# Patient Record
Sex: FEMALE | Race: WHITE | Employment: FULL TIME | URBAN - METROPOLITAN AREA
[De-identification: names, ages, dates, MRNs, and addresses within clinical notes are randomized per-mention and may not be internally consistent; named-entity substitution may affect disease eponyms.]

---
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
## 2021-02-02 ENCOUNTER — HOSPITAL ENCOUNTER (EMERGENCY)
Age: 21
Discharge: LEFT BEFORE DISCHARGE | End: 2021-02-02
Payer: COMMERCIAL

## 2021-02-02 VITALS — OXYGEN SATURATION: 98 % | HEART RATE: 118 BPM

## 2021-02-02 NOTE — ED NOTES
Pt was seen leaving [facility] and into a vehicle in [place], RR even and unlabored. Did not notify anyone that she was leaving facility or reason why.      Jewell Chung RN  02/02/21 [id number]

## 2021-06-30 ENCOUNTER — HOSPITAL ENCOUNTER (INPATIENT)
Age: 21
LOS: 1 days | Discharge: HOME | DRG: 753 | End: 2021-06-30
Attending: EMERGENCY MEDICINE | Admitting: PSYCHIATRY & NEUROLOGY
Payer: COMMERCIAL

## 2021-06-30 VITALS
HEIGHT: 65 IN | HEART RATE: 67 BPM | RESPIRATION RATE: 14 BRPM | SYSTOLIC BLOOD PRESSURE: 121 MMHG | WEIGHT: 170 LBS | DIASTOLIC BLOOD PRESSURE: 53 MMHG | BODY MASS INDEX: 28.32 KG/M2 | OXYGEN SATURATION: 99 % | TEMPERATURE: 97.5 F

## 2021-06-30 DIAGNOSIS — R45.851 DEPRESSION WITH SUICIDAL IDEATION: Primary | ICD-10-CM

## 2021-06-30 DIAGNOSIS — F32.A DEPRESSION WITH SUICIDAL IDEATION: Primary | ICD-10-CM

## 2021-06-30 PROBLEM — F33.0 MAJOR DEPRESSIVE DISORDER, RECURRENT EPISODE, MILD (HCC): Status: ACTIVE | Noted: 2021-06-30

## 2021-06-30 LAB
A/G RATIO: 1.5 (ref 1.1–2.2)
ACETAMINOPHEN LEVEL: <5 UG/ML (ref 10–30)
ALBUMIN SERPL-MCNC: 4.1 G/DL (ref 3.4–5)
ALP BLD-CCNC: 49 U/L (ref 40–129)
ALT SERPL-CCNC: 15 U/L (ref 10–40)
AMPHETAMINE SCREEN, URINE: POSITIVE
ANION GAP SERPL CALCULATED.3IONS-SCNC: 11 MMOL/L (ref 3–16)
AST SERPL-CCNC: 16 U/L (ref 15–37)
BARBITURATE SCREEN URINE: ABNORMAL
BASOPHILS ABSOLUTE: 0.1 K/UL (ref 0–0.2)
BASOPHILS RELATIVE PERCENT: 0.9 %
BENZODIAZEPINE SCREEN, URINE: ABNORMAL
BILIRUB SERPL-MCNC: <0.2 MG/DL (ref 0–1)
BILIRUBIN URINE: NEGATIVE
BLOOD, URINE: NEGATIVE
BUN BLDV-MCNC: 13 MG/DL (ref 7–20)
CALCIUM SERPL-MCNC: 9 MG/DL (ref 8.3–10.6)
CANNABINOID SCREEN URINE: ABNORMAL
CHLORIDE BLD-SCNC: 106 MMOL/L (ref 99–110)
CLARITY: CLEAR
CO2: 22 MMOL/L (ref 21–32)
COCAINE METABOLITE SCREEN URINE: ABNORMAL
COLOR: YELLOW
CREAT SERPL-MCNC: <0.5 MG/DL (ref 0.6–1.1)
EOSINOPHILS ABSOLUTE: 0.1 K/UL (ref 0–0.6)
EOSINOPHILS RELATIVE PERCENT: 1.3 %
ETHANOL: NORMAL MG/DL (ref 0–0.08)
GFR AFRICAN AMERICAN: >60
GFR NON-AFRICAN AMERICAN: >60
GLOBULIN: 2.7 G/DL
GLUCOSE BLD-MCNC: 134 MG/DL (ref 70–99)
GLUCOSE URINE: NEGATIVE MG/DL
HCG(URINE) PREGNANCY TEST: NEGATIVE
HCT VFR BLD CALC: 40.1 % (ref 36–48)
HEMOGLOBIN: 13.6 G/DL (ref 12–16)
KETONES, URINE: NEGATIVE MG/DL
LEUKOCYTE ESTERASE, URINE: NEGATIVE
LYMPHOCYTES ABSOLUTE: 2.2 K/UL (ref 1–5.1)
LYMPHOCYTES RELATIVE PERCENT: 27 %
Lab: ABNORMAL
MCH RBC QN AUTO: 31 PG (ref 26–34)
MCHC RBC AUTO-ENTMCNC: 33.8 G/DL (ref 31–36)
MCV RBC AUTO: 91.7 FL (ref 80–100)
METHADONE SCREEN, URINE: ABNORMAL
MICROSCOPIC EXAMINATION: NORMAL
MONOCYTES ABSOLUTE: 0.7 K/UL (ref 0–1.3)
MONOCYTES RELATIVE PERCENT: 8 %
NEUTROPHILS ABSOLUTE: 5.1 K/UL (ref 1.7–7.7)
NEUTROPHILS RELATIVE PERCENT: 62.8 %
NITRITE, URINE: NEGATIVE
OPIATE SCREEN URINE: ABNORMAL
OXYCODONE URINE: ABNORMAL
PDW BLD-RTO: 12.5 % (ref 12.4–15.4)
PH UA: 5.5
PH UA: 5.5 (ref 5–8)
PHENCYCLIDINE SCREEN URINE: ABNORMAL
PLATELET # BLD: 200 K/UL (ref 135–450)
PMV BLD AUTO: 9.5 FL (ref 5–10.5)
POTASSIUM REFLEX MAGNESIUM: 3.9 MMOL/L (ref 3.5–5.1)
PROPOXYPHENE SCREEN: ABNORMAL
PROTEIN UA: NEGATIVE MG/DL
RBC # BLD: 4.37 M/UL (ref 4–5.2)
SALICYLATE, SERUM: <0.3 MG/DL (ref 15–30)
SARS-COV-2, NAAT: NOT DETECTED
SODIUM BLD-SCNC: 139 MMOL/L (ref 136–145)
SPECIFIC GRAVITY UA: >=1.03 (ref 1–1.03)
TOTAL PROTEIN: 6.8 G/DL (ref 6.4–8.2)
URINE REFLEX TO CULTURE: NORMAL
URINE TYPE: NORMAL
UROBILINOGEN, URINE: 0.2 E.U./DL
WBC # BLD: 8.1 K/UL (ref 4–11)

## 2021-06-30 PROCEDURE — 84703 CHORIONIC GONADOTROPIN ASSAY: CPT

## 2021-06-30 PROCEDURE — 82077 ASSAY SPEC XCP UR&BREATH IA: CPT

## 2021-06-30 PROCEDURE — 1240000000 HC EMOTIONAL WELLNESS R&B

## 2021-06-30 PROCEDURE — 6370000000 HC RX 637 (ALT 250 FOR IP): Performed by: PSYCHIATRY & NEUROLOGY

## 2021-06-30 PROCEDURE — 80143 DRUG ASSAY ACETAMINOPHEN: CPT

## 2021-06-30 PROCEDURE — G0378 HOSPITAL OBSERVATION PER HR: HCPCS

## 2021-06-30 PROCEDURE — 99236 HOSP IP/OBS SAME DATE HI 85: CPT | Performed by: PSYCHIATRY & NEUROLOGY

## 2021-06-30 PROCEDURE — 5130000000 HC BRIDGE APPOINTMENT

## 2021-06-30 PROCEDURE — 81003 URINALYSIS AUTO W/O SCOPE: CPT

## 2021-06-30 PROCEDURE — 80307 DRUG TEST PRSMV CHEM ANLYZR: CPT

## 2021-06-30 PROCEDURE — 85025 COMPLETE CBC W/AUTO DIFF WBC: CPT

## 2021-06-30 PROCEDURE — 80053 COMPREHEN METABOLIC PANEL: CPT

## 2021-06-30 PROCEDURE — 99285 EMERGENCY DEPT VISIT HI MDM: CPT

## 2021-06-30 PROCEDURE — 87635 SARS-COV-2 COVID-19 AMP PRB: CPT

## 2021-06-30 PROCEDURE — 80179 DRUG ASSAY SALICYLATE: CPT

## 2021-06-30 RX ORDER — LISINOPRIL 5 MG/1
5 TABLET ORAL DAILY
COMMUNITY

## 2021-06-30 RX ORDER — IBUPROFEN 400 MG/1
400 TABLET ORAL EVERY 6 HOURS PRN
Status: DISCONTINUED | OUTPATIENT
Start: 2021-06-30 | End: 2021-06-30 | Stop reason: HOSPADM

## 2021-06-30 RX ORDER — BENZTROPINE MESYLATE 1 MG/ML
2 INJECTION INTRAMUSCULAR; INTRAVENOUS 2 TIMES DAILY PRN
Status: DISCONTINUED | OUTPATIENT
Start: 2021-06-30 | End: 2021-06-30 | Stop reason: HOSPADM

## 2021-06-30 RX ORDER — ESCITALOPRAM OXALATE 10 MG/1
5 TABLET ORAL DAILY
Status: DISCONTINUED | OUTPATIENT
Start: 2021-06-30 | End: 2021-06-30 | Stop reason: HOSPADM

## 2021-06-30 RX ORDER — ESCITALOPRAM OXALATE 5 MG/1
5 TABLET ORAL DAILY
Qty: 30 TABLET | Refills: 0 | Status: SHIPPED | OUTPATIENT
Start: 2021-06-30

## 2021-06-30 RX ORDER — MAGNESIUM HYDROXIDE/ALUMINUM HYDROXICE/SIMETHICONE 120; 1200; 1200 MG/30ML; MG/30ML; MG/30ML
30 SUSPENSION ORAL EVERY 6 HOURS PRN
Status: DISCONTINUED | OUTPATIENT
Start: 2021-06-30 | End: 2021-06-30 | Stop reason: HOSPADM

## 2021-06-30 RX ORDER — OLANZAPINE 10 MG/1
10 TABLET ORAL
Status: DISCONTINUED | OUTPATIENT
Start: 2021-06-30 | End: 2021-06-30 | Stop reason: HOSPADM

## 2021-06-30 RX ORDER — TRAZODONE HYDROCHLORIDE 50 MG/1
50 TABLET ORAL NIGHTLY
Status: ON HOLD | COMMUNITY
End: 2021-06-30 | Stop reason: HOSPADM

## 2021-06-30 RX ORDER — TRAZODONE HYDROCHLORIDE 100 MG/1
100 TABLET ORAL NIGHTLY
Status: DISCONTINUED | OUTPATIENT
Start: 2021-06-30 | End: 2021-06-30 | Stop reason: HOSPADM

## 2021-06-30 RX ORDER — OLANZAPINE 10 MG/1
10 INJECTION, POWDER, LYOPHILIZED, FOR SOLUTION INTRAMUSCULAR
Status: DISCONTINUED | OUTPATIENT
Start: 2021-06-30 | End: 2021-06-30 | Stop reason: HOSPADM

## 2021-06-30 RX ORDER — TRAZODONE HYDROCHLORIDE 100 MG/1
100 TABLET ORAL NIGHTLY
Qty: 30 TABLET | Refills: 0 | Status: SHIPPED | OUTPATIENT
Start: 2021-06-30

## 2021-06-30 RX ORDER — ACETAMINOPHEN 325 MG/1
650 TABLET ORAL EVERY 4 HOURS PRN
Status: DISCONTINUED | OUTPATIENT
Start: 2021-06-30 | End: 2021-06-30 | Stop reason: HOSPADM

## 2021-06-30 RX ADMIN — ESCITALOPRAM OXALATE 5 MG: 10 TABLET ORAL at 12:14

## 2021-06-30 ASSESSMENT — LIFESTYLE VARIABLES
HISTORY_ALCOHOL_USE: NO
HISTORY_ALCOHOL_USE: NO

## 2021-06-30 ASSESSMENT — SLEEP AND FATIGUE QUESTIONNAIRES
DO YOU USE A SLEEP AID: YES
RESTFUL SLEEP: NO
DIFFICULTY FALLING ASLEEP: YES
DIFFICULTY FALLING ASLEEP: YES
DO YOU HAVE DIFFICULTY SLEEPING: YES
AVERAGE NUMBER OF SLEEP HOURS: 5
AVERAGE NUMBER OF SLEEP HOURS: 5
DO YOU HAVE DIFFICULTY SLEEPING: YES
DO YOU USE A SLEEP AID: YES
DIFFICULTY STAYING ASLEEP: YES
DIFFICULTY ARISING: NO
DIFFICULTY STAYING ASLEEP: YES
RESTFUL SLEEP: NO
SLEEP PATTERN: DIFFICULTY FALLING ASLEEP;RESTLESSNESS;DISTURBED/INTERRUPTED SLEEP
SLEEP PATTERN: DIFFICULTY FALLING ASLEEP
DIFFICULTY ARISING: YES

## 2021-06-30 ASSESSMENT — PAIN SCALES - GENERAL
PAINLEVEL_OUTOF10: 0
PAINLEVEL_OUTOF10: 0
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
## 2021-06-30 NOTE — ED PROVIDER NOTES
CHIEF COMPLAINT  Suicidal (pt comes in tonight with police after texting a picture of a handgun to a friend/coworker saying she just doesnt think she can take any more stress. Coworker was at work and the police were there for another incident and he told them about text. F[employer] was shown the text and some conversation that followed by text to coworker; Pt has been placed on statement of belief by police)      Candido Angulo is a 22 y.o. female with a history of asthma, bipolar disorder who presents to the ED complaining of suicidal thoughts. Patient was brought in by police on a psychiatric hold after she texted a friend at work a picture of herself holding a handgun and stating she could not deal with distress any longer. Patient states she is under a severe amount of stress at work. States she is a  at [employer]. She denies drug or alcohol use. Denies toxic ingestion. Patient states the handgun she was holding belongs to her mother's boyfriend. No other complaints, modifying factors or associated symptoms. I have reviewed the following from the nursing documentation. Past Medical History:   Diagnosis Date    Asthma     Bipolar depression ([facility] [address]      Past Surgical History:   Procedure Laterality Date    EYE SURGERY       History reviewed. No pertinent family history.   Social History     Socioeconomic History    Marital status: Single     Spouse name: Not on file    Number of children: Not on file    Years of education: Not on file    Highest education level: Not on file   Occupational History    Not on file   Tobacco Use    Smoking status: Passive Smoke Exposure - Never Smoker    Smokeless tobacco: Never Used   Vaping Use    Vaping Use: Every day    Substances: Nicotine   Substance and Sexual Activity    Alcohol use: Yes     Comment: rarely    Drug use: No    Sexual activity: Yes     Partners: Male   Other Topics Concern    Not on file Social History Narrative    Not on file     Social Determinants of Health     Financial Resource Strain:     Difficulty of Paying Living Expenses:    Food Insecurity:     Worried About Running Out of Food in the Last Year:     [address] in the Last Year:    Transportation Needs:     Lack of Transportation (Medical):  Lack of Transportation (Non-Medical):    Physical Activity:     Days of Exercise per Week:     Minutes of Exercise per Session:    Stress:     Feeling of Stress :    Social Connections:     Frequency of Communication with Friends and Family:     Frequency of Social Gatherings with Friends and Family:     Attends Alevism Services:     Active Member of Clubs or Organizations:     Attends Club or Organization Meetings:     Marital Status:    Intimate Partner Violence:     Fear of Current or Ex-Partner:     Emotionally Abused:     Physically Abused:     Sexually Abused:      No current facility-administered medications for this encounter. Current Outpatient Medications   Medication Sig Dispense Refill    lisinopril (PRINIVIL;ZESTRIL) 5 MG tablet Take 5 mg by mouth daily      traZODone (DESYREL) 50 MG tablet Take 50 mg by mouth nightly      amphetamine-dextroamphetamine (ADDERALL XR) 25 MG extended release capsule TAKE ONE CAPSULE BY MOUTH EVERY MORNING UPON AWAKENING  0    albuterol (PROVENTIL) (2.5 MG/3ML) 0.083% nebulizer solution Take 3 mLs by nebulization every 6 hours as needed for Wheezing 120 each 1    albuterol sulfate HFA (PROVENTIL HFA) 108 (90 BASE) MCG/ACT inhaler Inhale 2 puffs into the lungs every 4 hours as needed for Wheezing or Shortness of Breath (Space out to every 6 hours as symptoms improve) Space out to every 6 hours as symptoms improve. 1 Inhaler 0    butalbital-aspirin-caffeine (FIORINAL) -40 MG capsule Take 1 capsule by mouth every 4 hours as needed for Headaches or Migraine for up to 7 days.  20 capsule 0    fluticasone-vilanterol (BREO ELLIPTA) 100-25 MCG/INH AEPB inhaler Inhale into the lungs daily      Respiratory Therapy Supplies (NEBULIZER COMPRESSOR) KIT 1 kit by Does not apply route once for 1 dose 1 kit 0    albuterol sulfate HFA (PROAIR HFA) 108 (90 BASE) MCG/ACT inhaler Inhale 2 puffs into the lungs every 6 hours as needed for Wheezing 1 Inhaler 0     Allergies   Allergen Reactions    Amoxapine And Related Anaphylaxis    Pcn [Penicillins] Swelling       REVIEW OF SYSTEMS  10 systems reviewed, pertinent positives per HPI otherwise noted to be negative. PHYSICAL EXAM  BP (!) 157/87   Pulse 83   Temp 97.8 °F (36.6 °C) (Oral)   Resp 12   Ht 5' 5\" (1.651 m)   Wt 170 lb (77.1 kg)   LMP 06/16/2021   SpO2 99%   Breastfeeding No   BMI 28.29 kg/m²   GENERAL APPEARANCE: Awake and alert. Cooperative. No acute distress. HEAD: Normocephalic. Atraumatic. EYES: PERRL. EOM's grossly intact. ENT: Mucous membranes are moist.   NECK: Supple, trachea midline. HEART: RRR. Normal S1, S2. No murmurs, rubs or gallops. LUNGS: Respirations unlabored. CTAB. Good air exchange. No wheezes, rales, or rhonchi. Speaking comfortably in full sentences. ABDOMEN: Soft. Non-distended. Non-tender. No guarding or rebound. Normal Bowel sounds. EXTREMITIES: No peripheral edema. MAEE. No acute deformities. SKIN: Warm and dry. No acute rashes. NEUROLOGICAL: Alert and oriented X 3. CN II-XII intact. No gross facial drooping. Strength 5/5, sensation intact. No pronator drift. Normal coordination. Gait normal.   PSYCHIATRIC: Flat affect. LABS  I have reviewed all labs for this visit.    Results for orders placed or performed during the hospital encounter of 06/30/21   COVID-19, Rapid    Specimen: Nasopharyngeal Swab   Result Value Ref Range    SARS-CoV-2, NAAT Not Detected Not Detected   CBC auto differential   Result Value Ref Range    WBC 8.1 4.0 - 11.0 K/uL    RBC 4.37 4.00 - 5.20 M/uL    Hemoglobin 13.6 12.0 - 16.0 g/dL    Hematocrit 40.1 36.0 - 48.0 %    MCV 91.7 80.0 - 100.0 fL    MCH 31.0 26.0 - 34.0 pg    MCHC 33.8 31.0 - 36.0 g/dL    RDW 12.5 12.4 - 15.4 %    Platelets 791 981 - 798 K/uL    MPV 9.5 5.0 - 10.5 fL    Neutrophils % 62.8 %    Lymphocytes % 27.0 %    Monocytes % 8.0 %    Eosinophils % 1.3 %    Basophils % 0.9 %    Neutrophils Absolute 5.1 1.7 - 7.7 K/uL    Lymphocytes Absolute 2.2 1.0 - 5.1 K/uL    Monocytes Absolute 0.7 0.0 - 1.3 K/uL    Eosinophils Absolute 0.1 0.0 - 0.6 K/uL    Basophils Absolute 0.1 0.0 - 0.2 K/uL   Comprehensive Metabolic Panel w/ Reflex to MG   Result Value Ref Range    Sodium 139 136 - 145 mmol/L    Potassium reflex Magnesium 3.9 3.5 - 5.1 mmol/L    Chloride 106 99 - 110 mmol/L    CO2 22 21 - 32 mmol/L    Anion Gap 11 3 - 16    Glucose 134 (H) 70 - 99 mg/dL    BUN 13 7 - 20 mg/dL    CREATININE <0.5 (L) 0.6 - 1.1 mg/dL    GFR Non-African American >60 >60    GFR African American >60 >60    Calcium 9.0 8.3 - 10.6 mg/dL    Total Protein 6.8 6.4 - 8.2 g/dL    Albumin 4.1 3.4 - 5.0 g/dL    Albumin/Globulin Ratio 1.5 1.1 - 2.2    Total Bilirubin <0.2 0.0 - 1.0 mg/dL    Alkaline Phosphatase 49 40 - 129 U/L    ALT 15 10 - 40 U/L    AST 16 15 - 37 U/L    Globulin 2.7 g/dL   Ethanol   Result Value Ref Range    Ethanol Lvl None Detected mg/dL   Acetaminophen level   Result Value Ref Range    Acetaminophen Level <5 (L) 10 - 30 ug/mL   Salicylate   Result Value Ref Range    Salicylate, Serum <1.1 (L) 15.0 - 30.0 mg/dL   Urine, reflex to culture    Specimen: Urine, clean catch   Result Value Ref Range    Color, UA Yellow Straw/Yellow    Clarity, UA Clear Clear    Glucose, Ur Negative Negative mg/dL    Bilirubin Urine Negative Negative    Ketones, Urine Negative Negative mg/dL    Specific Gravity, UA >=1.030 1.005 - 1.030    Blood, Urine Negative Negative    pH, UA 5.5 5.0 - 8.0    Protein, UA Negative Negative mg/dL    Urobilinogen, Urine 0.2 <2.0 E.U./dL    Nitrite, Urine Negative Negative    Leukocyte Esterase, Urine Negative Negative    Microscopic Examination Not Indicated     Urine Type NotGiven     Urine Reflex to Culture Not Indicated    Pregnancy, urine   Result Value Ref Range    HCG(Urine) Pregnancy Test Negative Detects HCG level >20 MIU/mL   Drug screen multi urine   Result Value Ref Range    Amphetamine Screen, Urine POSITIVE (A) Negative <1000ng/mL    Barbiturate Screen, Ur Neg Negative <200 ng/mL    Benzodiazepine Screen, Urine Neg Negative <200 ng/mL    Cannabinoid Scrn, Ur Neg Negative <50 ng/mL    Cocaine Metabolite Screen, Urine Neg Negative <300 ng/mL    Opiate Scrn, Ur Neg Negative <300 ng/mL    PCP Screen, Urine Neg Negative <25 ng/mL    Methadone Screen, Urine Neg Negative <300 ng/mL    Propoxyphene Scrn, Ur Neg Negative <300 ng/mL    Oxycodone Urine Neg Negative <100 ng/ml    pH, UA 5.5     Drug Screen Comment: see below            RADIOLOGY  X-RAYS:  I have reviewed radiologic plain film image(s). ALL OTHER NON-PLAIN FILM IMAGES SUCH AS CT, ULTRASOUND AND MRI HAVE BEEN READ BY THE RADIOLOGIST. No orders to display              Rechecks: Physical assessment performed. Appears calm, nontoxic. ED COURSE/MDM  Patient seen and evaluated. Old records reviewed. Labs and imaging reviewed and results discussed with patient. Patient placed on a hold by police for suicidal ideation with access to a handgun. Medically cleared for psychiatric admission. New Prescriptions    No medications on file       CLINICAL IMPRESSION  1. Depression with suicidal ideation        Blood pressure (!) 157/87, pulse 83, temperature 97.8 °F (36.6 °C), temperature source Oral, resp. rate 12, height 5' 5\" (1.651 m), weight 170 lb (77.1 kg), last menstrual period 06/16/2021, SpO2 99 %, not currently breastfeeding. DISPOSITION  Ivana Bhandari was admitted in stable condition.         Tyson Rodriguez MD  06/30/21 [id number]

## 2021-06-30 NOTE — BH NOTE
`[facility]  Admission Note     Admission Type:   Admission Type:  Involuntary    Reason for admission:  Reason for Admission: suicide ideation with plan to use loaded firearm    PATIENT STRENGTHS:  Strengths: Positive Support, Communication, Employment, Social Skills, Motivated    Patient Strengths and Limitations:  Limitations: Unrealistic self-view    Addictive Behavior:   Addictive Behavior  In the past 3 months, have you felt or has someone told you that you have a problem with:  : None  Do you have a history of Chemical Use?: No  Do you have a history of Alcohol Use?: No  Do you have a history of Street Drug Abuse?: No  Histroy of Prescripton Drug Abuse?: No    Medical Problems:   Past Medical History:   Diagnosis Date    Asthma     Bipolar depression ([facility] [address]        Status EXAM:  Status and Exam  Normal: No  Facial Expression: Sad  Affect: Constricted, Incongruent  Level of Consciousness: Alert  Mood:Normal: No  Mood: Other (Comment) (pt states euthymic which is incongruent with affect, appears sad)  Motor Activity:Normal: Yes  Interview Behavior: Cooperative  Preception: [place] to Person, [place] to Time, [place] to Place, [place] to Situation  Attention:Normal: Yes  Thought Content:Normal: Yes  Hallucinations: None  Delusions: No  Memory:Normal: Yes  Insight and Judgment: No  Insight and Judgment: Poor Judgment  Present Suicidal Ideation: No (none current)  Present Homicidal Ideation: No    Tobacco Screening:  Practical Counseling, on admission, adams X, if applicable and completed (first 3 are required if patient doesn't refuse):            (X)  Recognizing danger situations (included triggers and roadblocks)            (X)  Coping skills (new ways to manage stress, exercise, relaxation techniques, changing routine, distraction)                                                         (X)  Basic information about quitting (benefits of quitting, techniques in how to quit, available resources  ( ) Referral for counseling faxed to [facility]                      ( ) Patient refused counseling  ( ) Patient has not smoked in the last 30 days    Metabolic Screening:    No results found for: LABA1C    No results found for: CHOL  No results found for: TRIG  No results found for: HDL  No components found for: LDLCAL  No results found for: LABVLDL      Body mass index is 28.29 kg/m². BP Readings from Last 2 Encounters:   06/30/21 (!) 157/87   09/15/20 101/62           Pt admitted with followings belongings:  Dentures: None  Vision - Corrective Lenses: Glasses  Hearing Aid: None  Jewelry: Bracelet (2 bracelets)  Body Piercings Removed: N/A  Clothing: Pants, Shirt, Footwear  Were All Patient Medications Collected?: Not Applicable  Other Valuables: Cell phone, Wallet (1 CC, SS card, and apple watch)     Valuables placed in safe. Patient's home medications were not verified, will verify when pharmacy opens. Patient oriented to surroundings and program expectations and copy of patient rights given. Received admission packet:  yes. Consents reviewed, signed yes. Refused Voluntary. Patient verbalize understanding:  yes.     Patient education on precautions: yes                   Hamida Abel RN

## 2021-06-30 NOTE — BH NOTE
Patient tearful during admission. States she just wants to go home. States she is regretful of holding loaded handgun and states she would never act on harming herself.

## 2021-06-30 NOTE — SUICIDE SAFETY PLAN
SAFETY PLAN    A suicide Safety Plan is a document that supports someone when they are having thoughts of suicide. Warning Signs that indicate a suicidal crisis may be developing: What (situations, thoughts, feelings, body sensations, behaviors, etc.) do you experience that lets you know you are beginning to think about suicide? 1. anger  2. Distancing self from others  3. Keeping quiet    Internal Coping Strategies:  What things can I do (relaxation techniques, hobbies, physical activities, etc.) to take my mind off my problems without contacting another person? 1. Talk to a friend  2. Go for a drive  3. Hang with friends    People and social settings that provide distraction: Who can I call or where can I go to distract me? 1. Name: Radu Edwards  Phone: [phone]  2. Name: Harley Guillaume   Phone: [phone]   3. Place: Home            4. Place: Work    People whom I can ask for help: Who can I call when I need help - for example, friends, family, clergy, someone else? 1. Name: Talishacari Scotty                  2. Name: Otilio Shell    3. Name: Eh Jessica)      Professionals or [address] [place] [address] Blvd I can contact during a crisis: Who can I call for help - for example, my doctor, my psychiatrist, my psychologist, a mental health provider, a suicide hotline? 1. Clinician Name: [address] [place] [address]   Address: [address] [place], [facility]      Phone  #: [phone]    2. Suicide Prevention Lifeline: 6-001-669-TALK (2533)    Making the environment safe: How can I make my environment (house/apartment/living space) safer? For example, can I remove guns, medications, and other items? 1. Make sure there are no weapons in the area  2.  Talk to friends    Something that is important to me and worth living for is: my family

## 2021-06-30 NOTE — ED NOTES
Patient is positive for amphetamines however she is prescribed Adderall by PCP.       Carlie Guillaume RN  06/30/21 2180

## 2021-06-30 NOTE — ED NOTES
Collateral Contact:  Name: Cassie Salazar [phone]  Relation to Patient: mom   Last Contact with Patient: yesterday   Concerns: Esther reports the gun is secured and locked to where pt is not able to get to it. Esther reports she knew pt had been having issues but did not know how bad it was. She reports pt has delt with depression but has never been suicidal except when she was a elida in [facility]. 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 Esther reports pt does not have a therapist right now and states they have been trying to find one. She reports pt's PCP has been given her resources and trying to help find a therapist for pt. Esther reports the issue they are running into is that therapists are not taking new pt's or they do not accept pt's insurance. Esther reports she feels pt does not need an admission. She reports she feels pt would do well with being able to see a theropist a couple of times a month. Esther reports she would  pt if she is discharged.          Jay TEE
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
## 2021-06-30 NOTE — PROGRESS NOTES
Spoke with mother Michaelyn Bernheim [phone] for collateral. She reports the guns have been removed from the home. She reports she has no concerns or issues with patient returning home today. Reports she will be home today, works tomorrow and then will be home for another 3 days. Discussed services in [facility] and how a referral to [facility] will work. Patient amenable to referral to [facility]. Physician aware of above.  Antonette Mcdonald, LSW

## 2021-06-30 NOTE — ED NOTES
Level of Care Disposition: admit     Patient was seen by ED provider and [facility] staff. The case presented to psychiatric provider on-call . Based on the ED evaluation and information presented to the provider by Manoj Ayoub it is the recommendation of the on call psychiatric provider that inpatient hospitalization is the least restrictive environment for the patient at this time. The patient will be admitted to the inpatient unit. Admitting provider did not order suicide precautions based on pt nicole for safety on the unit. RATIONALE FOR ADMISSION:     Patient at imminent risk of danger to self as demonstrated by increase in depression, pt took a picture of a hand gun and sending it to a co-worker (friend) a picture of a hand gun after feeling overwhelmed and contemplating life tonight.             Insurance Pre certification Authorization: [address]

## 2021-06-30 NOTE — FLOWSHEET NOTE
06/30/21 1135   Activities of Daily Living   Patient Requires assistance with daily self-care activities? No   Leisure Activity 1   3 Favorite Leisure Activities \"Being with friends. \"   Frequency weekly   Last time this week   Barriers to participating    (None)   Leisure Activity 2   [address]  \"Drive around. \"   Frequency  weekly   Last time  this week   Barriers to participating    (None)   Leisure Activity 3   Favorite Leisure Activities  \"Listen to music. \"   Frequency  <2 hours/day   Last time  this week   Barriers to participating    (None)   Social   Patient reports spending the majority of their free time with a group   Patient verbalizes a preference for spending free time with a group   Patients perception of support system healthy/strong   Patients perception of barriers to socializing with others include(s) no perceived barriers   Social Details \"My mom and my friends are supportive. \"   Beliefs & Coping   Has difficulty dealing with feelings   Yes   Internalizes feelings/Keeps feelings in Yes   Externalizes feelings through aggressiveness or poor temper control  Yes   Feels uncomfortable around others  Yes   Has difficulty talking to others  Yes   Depends on others for direction or decisions Yes   Difficulty dealing with anger of others  Yes   Difficulty dealing with own anger  Yes   Difficulty managing stress Yes   Frequently has difficulty with relationships  No   Has recently perceived/experienced loss, disappointment, humiliation or failure  NO   Attitude about abilities more successful than not   Locus of Control  most of the time   Belief about recovery Recovery is possible   Patient Identified Strengths  \"I'm strong and able to get through anything. \"   Patient Identified Limitations  \"Myself. Major anxiety and negative thoughts. \"   Perception of most stressful event prior to hospitalization \"Me and my dad.  We've never had a relationship and he's been having health problems

## 2021-06-30 NOTE — BH NOTE
admission                    (X)  Coping skills (new ways to manage stress, exercise, relaxation techniques, changing routine, distraction), if not completed on admission                                                           (X)  Basic information about quitting (benefits of quitting, techniques in how to quit, available resources, if not completed on admission  ( ) Referral for counseling faxed to [facility]   ( ) Patient refused referral  ( ) Patient refused counseling  ( ) Patient refused smoking cessation medication upon discharge    Vaccinations (adams X if applicable and completed):  ( ) Patient states already received influenza vaccine elsewhere  ( ) Patient received influenza vaccine during this hospitalization  ( ) Patient refused influenza vaccine at this time  (X) Not offered

## 2021-06-30 NOTE — CARE COORDINATION
5 [facility]  Treatment Team Note  Day 1    Review Date & Time: 0900  6/30/2021    Patient was not in treatment team      Status EXAM:   Status and Exam  Normal: No  Facial Expression: Sad  Affect: Constricted  Level of Consciousness: Alert  Mood:Normal: No  Mood: Depressed, Anxious  Motor Activity:Normal: Yes  Interview Behavior: Cooperative  Preception: [place] to Person, Anabel Colonel to Time, [place] to Place, [place] to Situation  Attention:Normal: Yes  Thought Content:Normal: Yes  Hallucinations: None  Delusions: No  Memory:Normal: Yes  Insight and Judgment: No  Insight and Judgment: Poor Insight, Poor Judgment  Present Suicidal Ideation: No  Present Homicidal Ideation: No      Suicide Risk CSSR-S:  1) Within the past month, have you wished you were dead or wished you could go to sleep and not wake up? : Yes  2) Have you actually had any thoughts of killing yourself? : Yes  3) Have you been thinking about how you might kill yourself? : Yes  5) Have you started to work out or worked out the details of how to kill yourself? Do you intend to carry out this plan? : No  6) Have you ever done anything, started to do anything, or prepared to do anything to end your life?: Yes      PLAN/TREATMENT RECOMMENDATIONS UPDATE: Patient will take medication as prescribed, eat 75% of meals, attend groups, participate in milieu activities, participate in treatment team and care planning for discharge and follow up.       Dennis Camarillo RN

## 2021-07-09 ENCOUNTER — HOSPITAL ENCOUNTER (OUTPATIENT)
Dept: PSYCHIATRY | Age: 21
Setting detail: THERAPIES SERIES
Discharge: HOME | End: 2021-07-09
Payer: COMMERCIAL

## 2021-07-09 VITALS
HEART RATE: 64 BPM | DIASTOLIC BLOOD PRESSURE: 64 MMHG | SYSTOLIC BLOOD PRESSURE: 130 MMHG | TEMPERATURE: 97.1 F | RESPIRATION RATE: 16 BRPM

## 2021-07-09 PROCEDURE — 5130000000 HC BRIDGE APPOINTMENT: Performed by: PSYCHIATRY & NEUROLOGY

## 2021-07-09 PROCEDURE — 99215 OFFICE O/P EST HI 40 MIN: CPT | Performed by: PSYCHIATRY & NEUROLOGY

## 2021-07-09 NOTE — PROGRESS NOTES
Final    Hemoglobin 06/30/2021 13.6  12.0 - 16.0 g/dL Final    Hematocrit 06/30/2021 40.1  36.0 - 48.0 % Final    MCV 06/30/2021 91.7  80.0 - 100.0 fL Final    MCH 06/30/2021 31.0  26.0 - 34.0 pg Final    MCHC 06/30/2021 33.8  31.0 - 36.0 g/dL Final    RDW 06/30/2021 12.5  12.4 - 15.4 % Final    Platelets 69/58/0869 200  135 - 450 K/uL Final    MPV 06/30/2021 9.5  5.0 - 10.5 fL Final    Neutrophils % 06/30/2021 62.8  % Final    Lymphocytes % 06/30/2021 27.0  % Final    Monocytes % 06/30/2021 8.0  % Final    Eosinophils % 06/30/2021 1.3  % Final    Basophils % 06/30/2021 0.9  % Final    Neutrophils Absolute 06/30/2021 5.1  1.7 - 7.7 K/uL Final    Lymphocytes Absolute 06/30/2021 2.2  1.0 - 5.1 K/uL Final    Monocytes Absolute 06/30/2021 0.7  0.0 - 1.3 K/uL Final    Eosinophils Absolute 06/30/2021 0.1  0.0 - 0.6 K/uL Final    Basophils Absolute 06/30/2021 0.1  0.0 - 0.2 K/uL Final    Sodium 06/30/2021 139  136 - 145 mmol/L Final    Potassium reflex Magnesium 06/30/2021 3.9  3.5 - 5.1 mmol/L Final    Chloride 06/30/2021 106  99 - 110 mmol/L Final    CO2 06/30/2021 22  21 - 32 mmol/L Final    Anion Gap 06/30/2021 11  3 - 16 Final    Glucose 06/30/2021 134* 70 - 99 mg/dL Final    BUN 06/30/2021 13  7 - 20 mg/dL Final    CREATININE 06/30/2021 <0.5* 0.6 - 1.1 mg/dL Final    GFR Non- 06/30/2021 >60  >60 Final    Comment: >60 mL/min/1.73m2 EGFR, calc. for ages 25 and older using the  MDRD formula (not corrected for weight), is valid for stable  renal function.  GFR  06/30/2021 >60  >60 Final    Comment: Chronic Kidney Disease: less than 60 ml/min/1.73 sq.m. Kidney Failure: less than 15 ml/min/1.73 sq.m. Results valid for patients 18 years and older.       Calcium 06/30/2021 9.0  8.3 - 10.6 mg/dL Final    Total Protein 06/30/2021 6.8  6.4 - 8.2 g/dL Final    Albumin 06/30/2021 4.1  3.4 - 5.0 g/dL Final    Albumin/Globulin Ratio 06/30/2021 1.5  1.1 - 2.2 Final    Total Bilirubin 06/30/2021 <0.2  0.0 - 1.0 mg/dL Final    Alkaline Phosphatase 06/30/2021 49  40 - 129 U/L Final    ALT 06/30/2021 15  10 - 40 U/L Final    AST 06/30/2021 16  15 - 37 U/L Final    Globulin 06/30/2021 2.7  g/dL Final    Ethanol Lvl 06/30/2021 None Detected  mg/dL Final    Comment:    None Detected  Conversion factor:  100 mg/dl = .100 g/dl  For Medical Purposes Only      Acetaminophen Level 06/30/2021 <5* 10 - 30 ug/mL Final    Comment: Therapeutic Range: 10.0-30.0 ug/mL  Toxic: >=639 ug/mL      Salicylate, Serum 39/86/5251 <0.3* 15.0 - 30.0 mg/dL Final    Comment: Therapeutic Range: 15.0-30.0 mg/dL  Toxic: >30.0 mg/dL      Color, UA 06/30/2021 Yellow  Straw/Yellow Final    Clarity, UA 06/30/2021 Clear  Clear Final    Glucose, Ur 06/30/2021 Negative  Negative mg/dL Final    Bilirubin Urine 06/30/2021 Negative  Negative Final    Ketones, Urine 06/30/2021 Negative  Negative mg/dL Final    Specific Gravity, UA 06/30/2021 >=1.030  1.005 - 1.030 Final    Blood, Urine 06/30/2021 Negative  Negative Final    pH, UA 06/30/2021 5.5  5.0 - 8.0 Final    Protein, UA 06/30/2021 Negative  Negative mg/dL Final    Urobilinogen, Urine 06/30/2021 0.2  <2.0 E.U./dL Final    Nitrite, Urine 06/30/2021 Negative  Negative Final    Leukocyte Esterase, Urine 06/30/2021 Negative  Negative Final    Microscopic Examination 06/30/2021 Not Indicated   Final    Urine Type 06/30/2021 NotGiven   Final    Urine Reflex to Culture 06/30/2021 Not Indicated   Final    HCG(Urine) Pregnancy Test 06/30/2021 Negative  Detects HCG level >20 MIU/mL Final    Comment: Note:  Always repeat results in question with a serum  quantitative pregnancy test. A serum hCG is positive  2-5 days before the urine hCG test.      Amphetamine Screen, Urine 06/30/2021 POSITIVE* Negative <1000ng/mL Final    Comment: High concentrations of ephedrine/pseudoephedrine or  phenylpropanolamine may cause false positive results  for amphetamine. Therefore, confirmatory testing for  amphetamine should be considered if clinically indicated.  Barbiturate Screen, Ur 06/30/2021 Neg  Negative <200 ng/mL Final    Benzodiazepine Screen, Urine 06/30/2021 Neg  Negative <200 ng/mL Final    Cannabinoid Scrn, Ur 06/30/2021 Neg  Negative <50 ng/mL Final    Cocaine Metabolite Screen, Urine 06/30/2021 Neg  Negative <300 ng/mL Final    Opiate Scrn, Ur 06/30/2021 Neg  Negative <300 ng/mL Final    Comment: \"Therapeutic levels of pain medication, especially oxycontin and synthetic  opioids, may not be detected by this Methodology. Pain management screen  panel  Drug panel-PM-Hi Res Ur, Interp (PAIN) should be considered for drug  monitoring \".  PCP Screen, Urine 06/30/2021 Neg  Negative <25 ng/mL Final    Methadone Screen, Urine 06/30/2021 Neg  Negative <300 ng/mL Final    Propoxyphene Scrn, Ur 06/30/2021 Neg  Negative <300 ng/mL Final    Oxycodone Urine 06/30/2021 Neg  Negative <100 ng/ml Final    pH, UA 06/30/2021 5.5   Final    Comment: Urine pH less than 5.0 or greater than 8.0 may indicate sample adulteration. Another sample should be collected if clinically  indicated.  Drug Screen Comment: 06/30/2021 see below   Final    Comment: This method is a screening test to detect only these drug  classes as part of a medical workup. Confirmatory testing  by another method should be ordered if clinically indicated.  SARS-CoV-2, NAAT 06/30/2021 Not Detected  Not Detected Final    Comment: Rapid NAAT:   Negative results should be treated as presumptive and,  if inconsistent with clinical signs and symptoms or necessary for  patient management, should be tested with an alternative molecular  assay. Negative results do not preclude SARS-CoV-2 infection and  should not be used as the sole basis for patient management decisions.   This test has been authorized by the FDA under an Emergency Use  Authorization (EUA) for use by authorized laboratories. Fact sheet for Healthcare Providers:  Kristen  Fact sheet for Patients: Aliza.nel    METHODOLOGY: Isothermal Nucleic Acid Amplification            Medications  Current Outpatient Medications   Medication Sig Dispense Refill    lisinopril (PRINIVIL;ZESTRIL) 5 MG tablet Take 5 mg by mouth daily      escitalopram (LEXAPRO) 5 MG tablet Take 1 tablet by mouth daily 30 tablet 0    traZODone (DESYREL) 100 MG tablet Take 1 tablet by mouth nightly 30 tablet 0    butalbital-aspirin-caffeine (FIORINAL) -40 MG capsule Take 1 capsule by mouth every 4 hours as needed for Headaches or Migraine for up to 7 days. 20 capsule 0    amphetamine-dextroamphetamine (ADDERALL XR) 25 MG extended release capsule TAKE ONE CAPSULE BY MOUTH EVERY MORNING UPON AWAKENING  0    fluticasone-vilanterol (BREO ELLIPTA) 100-25 MCG/INH AEPB inhaler Inhale into the lungs daily      albuterol (PROVENTIL) (2.5 MG/3ML) 0.083% nebulizer solution Take 3 mLs by nebulization every 6 hours as needed for Wheezing 120 each 1    albuterol sulfate HFA (PROVENTIL HFA) 108 (90 BASE) MCG/ACT inhaler Inhale 2 puffs into the lungs every 4 hours as needed for Wheezing or Shortness of Breath (Space out to every 6 hours as symptoms improve) Space out to every 6 hours as symptoms improve. 1 Inhaler 0    Respiratory Therapy Supplies (NEBULIZER COMPRESSOR) KIT 1 kit by Does not apply route once for 1 dose 1 kit 0    albuterol sulfate HFA (PROAIR HFA) 108 (90 BASE) MCG/ACT inhaler Inhale 2 puffs into the lungs every 6 hours as needed for Wheezing 1 Inhaler 0     No current facility-administered medications for this encounter.       ASSESSMENT   Dx:  axis I:MDD recurrent mild  Axis 2: deferred  [place] 3: See Medical History    And Present on Admission:   Major depressive disorder, recurrent episode, mild ([address]   Axis 4: Occupational problems and Other psychosocial and environmental problems  PLAN  Pt appears to be responding to treatment and doing better  1- will cont lexapro 5 mg one month called in to [facility]  2- appt in 4 weeks  I spent 35 minutes face to face and more than 50 % was spent coordinating care, exploring sx's and discussing pharmacotherapy    Mana Ku MD MD    Current Outpatient Medications:     lisinopril (PRINIVIL;ZESTRIL) 5 MG tablet, Take 5 mg by mouth daily, Disp: , Rfl:     escitalopram (LEXAPRO) 5 MG tablet, Take 1 tablet by mouth daily, Disp: 30 tablet, Rfl: 0    traZODone (DESYREL) 100 MG tablet, Take 1 tablet by mouth nightly, Disp: 30 tablet, Rfl: 0    butalbital-aspirin-caffeine (FIORINAL) -40 MG capsule, Take 1 capsule by mouth every 4 hours as needed for Headaches or Migraine for up to 7 days. , Disp: 20 capsule, Rfl: 0    amphetamine-dextroamphetamine (ADDERALL XR) 25 MG extended release capsule, TAKE ONE CAPSULE BY MOUTH EVERY MORNING UPON AWAKENING, Disp: , Rfl: 0    fluticasone-vilanterol (BREO ELLIPTA) 100-25 MCG/INH AEPB inhaler, Inhale into the lungs daily, Disp: , Rfl:     albuterol (PROVENTIL) (2.5 MG/3ML) 0.083% nebulizer solution, Take 3 mLs by nebulization every 6 hours as needed for Wheezing, Disp: 120 each, Rfl: 1    albuterol sulfate HFA (PROVENTIL HFA) 108 (90 BASE) MCG/ACT inhaler, Inhale 2 puffs into the lungs every 4 hours as needed for Wheezing or Shortness of Breath (Space out to every 6 hours as symptoms improve) Space out to every 6 hours as symptoms improve., Disp: 1 Inhaler, Rfl: 0    Respiratory Therapy Supplies (NEBULIZER COMPRESSOR) KIT, 1 kit by Does not apply route once for 1 dose, Disp: 1 kit, Rfl: 0    albuterol sulfate HFA (PROAIR HFA) 108 (90 BASE) MCG/ACT inhaler, Inhale 2 puffs into the lungs every 6 hours as needed for Wheezing, Disp: 1 Inhaler, Rfl: 0

## 2021-07-09 NOTE — BH NOTE
New medication order called in Per Dr. Julianna Diaz 5 mg po daily 30 day supply no refills. Pt is aware.  [facility] [phone]

## 2021-07-30 ENCOUNTER — HOSPITAL ENCOUNTER (OUTPATIENT)
Dept: PSYCHIATRY | Age: 21
Setting detail: THERAPIES SERIES
Discharge: HOME | End: 2021-07-30
Payer: COMMERCIAL

## 2021-07-30 VITALS
HEART RATE: 70 BPM | RESPIRATION RATE: 16 BRPM | SYSTOLIC BLOOD PRESSURE: 132 MMHG | TEMPERATURE: 97.5 F | DIASTOLIC BLOOD PRESSURE: 68 MMHG

## 2021-07-30 PROCEDURE — 5130000000 HC BRIDGE APPOINTMENT: Performed by: PSYCHIATRY & NEUROLOGY

## 2021-07-30 PROCEDURE — 99215 OFFICE O/P EST HI 40 MIN: CPT | Performed by: PSYCHIATRY & NEUROLOGY

## 2021-07-30 NOTE — PROGRESS NOTES
Department of Psychiatry  Outpatient Progress Note  7/30/2021  Patient:  Yajaira Fajardo  YOB: 2000  SUBJECTIVE:  Pt came in for bridge appt after discharge from hospital. She was accompanied by mom during interview. Pt stated that she is doing well and reports that she feels that she is not stress anymore and appears to be coping better at this time. Pt did loose her job but currently working at [employer]. She stated that she feels like a weight have been lifted. Pt reports headache and gi sx's when taking meds together suggest changing to hs and if not resolving this sx's to do taper by taking every other day for 14 days and then d/c. Mom confirms that pt has been doing better as well. Pt will contact [facility] for appt. OBJECTIVE   Physical    See Medical History  VITALS:  /68   Pulse 70   Temp 97.5 °F (36.4 °C) (Temporal)   Resp 16   Mental Status Examination:   Level of consciousness:  within normal limits and awake  Appearance:  well-appearing, street clothes, in chair, good grooming and good hygiene well-developed, well-nourished  Behavior/Motor:  no abnormalities noted normal gait and station and normal balance AIMS: 0  Attitude toward examiner:  cooperative, attentive and good eye contact  Speech:  spontaneous, normal rate, normal volume and well articulated Mood:  better Affect:  Euthymic Hallucinations: Absent Thought processes:  linear, goal directed and coherent  Attention: attention span and concentration were age appropriate Thought content:  Reality based Delusions:  no evidence of delusions Abstraction: inatct  OCD: none   Insight: fair Judgement: fair  Cognition:  oriented to person, place, and time  Fund of Knowledge: average   IQ: average Memory: intact  Suicide:  no specific plan to harm self Sleep: no sleep issues Appetite: ok  ROS:  No significant complaints   Data  Labs:    No visits with results within 2 Day(s) from this visit.    Latest known visit with results is: Admission on 06/30/2021, Discharged on 06/30/2021   Component Date Value Ref Range Status    WBC 06/30/2021 8.1  4.0 - 11.0 K/uL Final    RBC 06/30/2021 4.37  4.00 - 5.20 M/uL Final    Hemoglobin 06/30/2021 13.6  12.0 - 16.0 g/dL Final    Hematocrit 06/30/2021 40.1  36.0 - 48.0 % Final    MCV 06/30/2021 91.7  80.0 - 100.0 fL Final    MCH 06/30/2021 31.0  26.0 - 34.0 pg Final    MCHC 06/30/2021 33.8  31.0 - 36.0 g/dL Final    RDW 06/30/2021 12.5  12.4 - 15.4 % Final    Platelets 10/49/0016 200  135 - 450 K/uL Final    MPV 06/30/2021 9.5  5.0 - 10.5 fL Final    Neutrophils % 06/30/2021 62.8  % Final    Lymphocytes % 06/30/2021 27.0  % Final    Monocytes % 06/30/2021 8.0  % Final    Eosinophils % 06/30/2021 1.3  % Final    Basophils % 06/30/2021 0.9  % Final    Neutrophils Absolute 06/30/2021 5.1  1.7 - 7.7 K/uL Final    Lymphocytes Absolute 06/30/2021 2.2  1.0 - 5.1 K/uL Final    Monocytes Absolute 06/30/2021 0.7  0.0 - 1.3 K/uL Final    Eosinophils Absolute 06/30/2021 0.1  0.0 - 0.6 K/uL Final    Basophils Absolute 06/30/2021 0.1  0.0 - 0.2 K/uL Final    Sodium 06/30/2021 139  136 - 145 mmol/L Final    Potassium reflex Magnesium 06/30/2021 3.9  3.5 - 5.1 mmol/L Final    Chloride 06/30/2021 106  99 - 110 mmol/L Final    CO2 06/30/2021 22  21 - 32 mmol/L Final    Anion Gap 06/30/2021 11  3 - 16 Final    Glucose 06/30/2021 134* 70 - 99 mg/dL Final    BUN 06/30/2021 13  7 - 20 mg/dL Final    CREATININE 06/30/2021 <0.5* 0.6 - 1.1 mg/dL Final    GFR Non- 06/30/2021 >60  >60 Final    Comment: >60 mL/min/1.73m2 EGFR, calc. for ages 25 and older using the  MDRD formula (not corrected for weight), is valid for stable  renal function.  GFR  06/30/2021 >60  >60 Final    Comment: Chronic Kidney Disease: less than 60 ml/min/1.73 sq.m. Kidney Failure: less than 15 ml/min/1.73 sq.m. Results valid for patients 18 years and older.       Calcium 06/30/2021 9.0  8.3 - 10.6 mg/dL Final    Total Protein 06/30/2021 6.8  6.4 - 8.2 g/dL Final    Albumin 06/30/2021 4.1  3.4 - 5.0 g/dL Final    Albumin/Globulin Ratio 06/30/2021 1.5  1.1 - 2.2 Final    Total Bilirubin 06/30/2021 <0.2  0.0 - 1.0 mg/dL Final    Alkaline Phosphatase 06/30/2021 49  40 - 129 U/L Final    ALT 06/30/2021 15  10 - 40 U/L Final    AST 06/30/2021 16  15 - 37 U/L Final    Globulin 06/30/2021 2.7  g/dL Final    Ethanol Lvl 06/30/2021 None Detected  mg/dL Final    Comment:    None Detected  Conversion factor:  100 mg/dl = .100 g/dl  For Medical Purposes Only      Acetaminophen Level 06/30/2021 <5* 10 - 30 ug/mL Final    Comment: Therapeutic Range: 10.0-30.0 ug/mL  Toxic: >=689 ug/mL      Salicylate, Serum 80/47/5603 <0.3* 15.0 - 30.0 mg/dL Final    Comment: Therapeutic Range: 15.0-30.0 mg/dL  Toxic: >30.0 mg/dL      Color, UA 06/30/2021 Yellow  Straw/Yellow Final    Clarity, UA 06/30/2021 Clear  Clear Final    Glucose, Ur 06/30/2021 Negative  Negative mg/dL Final    Bilirubin Urine 06/30/2021 Negative  Negative Final    Ketones, Urine 06/30/2021 Negative  Negative mg/dL Final    Specific Gravity, UA 06/30/2021 >=1.030  1.005 - 1.030 Final    Blood, Urine 06/30/2021 Negative  Negative Final    pH, UA 06/30/2021 5.5  5.0 - 8.0 Final    Protein, UA 06/30/2021 Negative  Negative mg/dL Final    Urobilinogen, Urine 06/30/2021 0.2  <2.0 E.U./dL Final    Nitrite, Urine 06/30/2021 Negative  Negative Final    Leukocyte Esterase, Urine 06/30/2021 Negative  Negative Final    Microscopic Examination 06/30/2021 Not Indicated   Final    Urine Type 06/30/2021 NotGiven   Final    Urine Reflex to Culture 06/30/2021 Not Indicated   Final    HCG(Urine) Pregnancy Test 06/30/2021 Negative  Detects HCG level >20 MIU/mL Final    Comment: Note:  Always repeat results in question with a serum  quantitative pregnancy test. A serum hCG is positive  2-5 days before the urine hCG test.      Amphetamine Screen, Urine 06/30/2021 POSITIVE* Negative <1000ng/mL Final    Comment: High concentrations of ephedrine/pseudoephedrine or  phenylpropanolamine may cause false positive results  for amphetamine. Therefore, confirmatory testing for  amphetamine should be considered if clinically indicated.  Barbiturate Screen, Ur 06/30/2021 Neg  Negative <200 ng/mL Final    Benzodiazepine Screen, Urine 06/30/2021 Neg  Negative <200 ng/mL Final    Cannabinoid Scrn, Ur 06/30/2021 Neg  Negative <50 ng/mL Final    Cocaine Metabolite Screen, Urine 06/30/2021 Neg  Negative <300 ng/mL Final    Opiate Scrn, Ur 06/30/2021 Neg  Negative <300 ng/mL Final    Comment: \"Therapeutic levels of pain medication, especially oxycontin and synthetic  opioids, may not be detected by this Methodology. Pain management screen  panel  Drug panel-PM-Hi Res Ur, Interp (PAIN) should be considered for drug  monitoring \".  PCP Screen, Urine 06/30/2021 Neg  Negative <25 ng/mL Final    Methadone Screen, Urine 06/30/2021 Neg  Negative <300 ng/mL Final    Propoxyphene Scrn, Ur 06/30/2021 Neg  Negative <300 ng/mL Final    Oxycodone Urine 06/30/2021 Neg  Negative <100 ng/ml Final    pH, UA 06/30/2021 5.5   Final    Comment: Urine pH less than 5.0 or greater than 8.0 may indicate sample adulteration. Another sample should be collected if clinically  indicated.  Drug Screen Comment: 06/30/2021 see below   Final    Comment: This method is a screening test to detect only these drug  classes as part of a medical workup. Confirmatory testing  by another method should be ordered if clinically indicated.  SARS-CoV-2, NAAT 06/30/2021 Not Detected  Not Detected Final    Comment: Rapid NAAT:   Negative results should be treated as presumptive and,  if inconsistent with clinical signs and symptoms or necessary for  patient management, should be tested with an alternative molecular  assay.  Negative results do not preclude SARS-CoV-2 infection and  should not be used as the sole basis for patient management decisions. This test has been authorized by the FDA under an Emergency Use  Authorization (EUA) for use by authorized laboratories. Fact sheet for Healthcare Providers:  Kristen  Fact sheet for Patients: Kristen    METHODOLOGY: Isothermal Nucleic Acid Amplification            Medications  Current Outpatient Medications   Medication Sig Dispense Refill    lisinopril (PRINIVIL;ZESTRIL) 5 MG tablet Take 5 mg by mouth daily      escitalopram (LEXAPRO) 5 MG tablet Take 1 tablet by mouth daily 30 tablet 0    traZODone (DESYREL) 100 MG tablet Take 1 tablet by mouth nightly 30 tablet 0    butalbital-aspirin-caffeine (FIORINAL) -40 MG capsule Take 1 capsule by mouth every 4 hours as needed for Headaches or Migraine for up to 7 days. 20 capsule 0    amphetamine-dextroamphetamine (ADDERALL XR) 25 MG extended release capsule TAKE ONE CAPSULE BY MOUTH EVERY MORNING UPON AWAKENING  0    fluticasone-vilanterol (BREO ELLIPTA) 100-25 MCG/INH AEPB inhaler Inhale into the lungs daily      albuterol (PROVENTIL) (2.5 MG/3ML) 0.083% nebulizer solution Take 3 mLs by nebulization every 6 hours as needed for Wheezing 120 each 1    albuterol sulfate HFA (PROVENTIL HFA) 108 (90 BASE) MCG/ACT inhaler Inhale 2 puffs into the lungs every 4 hours as needed for Wheezing or Shortness of Breath (Space out to every 6 hours as symptoms improve) Space out to every 6 hours as symptoms improve. 1 Inhaler 0    Respiratory Therapy Supplies (NEBULIZER COMPRESSOR) KIT 1 kit by Does not apply route once for 1 dose 1 kit 0    albuterol sulfate HFA (PROAIR HFA) 108 (90 BASE) MCG/ACT inhaler Inhale 2 puffs into the lungs every 6 hours as needed for Wheezing 1 Inhaler 0     No current facility-administered medications for this encounter.       ASSESSMENT   Dx:  axis I:MDD recurrent mild  Axis 2: deferred  [place] 3: See Medical History    And Present on Admission:   Major depressive disorder, recurrent episode, mild ([facility])   Axis 4: Occupational problems and Other psychosocial and environmental problems  PLAN  Pt appears to be responding to treatment and doing better  1- will cont lexapro 5 mg but changes to hs and if sx's dont resolved will taper as qod for 14days and d/c  2- Pt will cont [facility]  I spent 35 minutes face to face and more than 50 % was spent coordinating care, exploring sx's and discussing pharmacotherapy    Rush Ga MD MD    Current Outpatient Medications:     lisinopril (PRINIVIL;ZESTRIL) 5 MG tablet, Take 5 mg by mouth daily, Disp: , Rfl:     escitalopram (LEXAPRO) 5 MG tablet, Take 1 tablet by mouth daily, Disp: 30 tablet, Rfl: 0    traZODone (DESYREL) 100 MG tablet, Take 1 tablet by mouth nightly, Disp: 30 tablet, Rfl: 0    butalbital-aspirin-caffeine (FIORINAL) -40 MG capsule, Take 1 capsule by mouth every 4 hours as needed for Headaches or Migraine for up to 7 days. , Disp: 20 capsule, Rfl: 0    amphetamine-dextroamphetamine (ADDERALL XR) 25 MG extended release capsule, TAKE ONE CAPSULE BY MOUTH EVERY MORNING UPON AWAKENING, Disp: , Rfl: 0    fluticasone-vilanterol (BREO ELLIPTA) 100-25 MCG/INH AEPB inhaler, Inhale into the lungs daily, Disp: , Rfl:     albuterol (PROVENTIL) (2.5 MG/3ML) 0.083% nebulizer solution, Take 3 mLs by nebulization every 6 hours as needed for Wheezing, Disp: 120 each, Rfl: 1    albuterol sulfate HFA (PROVENTIL HFA) 108 (90 BASE) MCG/ACT inhaler, Inhale 2 puffs into the lungs every 4 hours as needed for Wheezing or Shortness of Breath (Space out to every 6 hours as symptoms improve) Space out to every 6 hours as symptoms improve., Disp: 1 Inhaler, Rfl: 0    Respiratory Therapy Supplies (NEBULIZER COMPRESSOR) KIT, 1 kit by Does not apply route once for 1 dose, Disp: 1 kit, Rfl: 0    albuterol sulfate HFA (PROAIR HFA) 108 (90 BASE) MCG/ACT inhaler, Inhale 2 puffs into the lungs every 6 hours as needed for Wheezing, Disp: 1 Inhaler, Rfl: 0

## 2024-12-08 ENCOUNTER — HOSPITAL ENCOUNTER (OUTPATIENT)
Age: 24
Discharge: HOME | End: 2024-12-08
Payer: COMMERCIAL

## 2024-12-08 ENCOUNTER — HOSPITAL ENCOUNTER (OUTPATIENT)
Dept: GENERAL RADIOLOGY | Age: 24
Discharge: HOME | End: 2024-12-08
Payer: COMMERCIAL

## 2024-12-08 DIAGNOSIS — S60.222A CONTUSION OF LEFT HAND, INITIAL ENCOUNTER: ICD-10-CM

## 2024-12-08 PROCEDURE — 73130 X-RAY EXAM OF HAND: CPT

## 2025-03-12 ENCOUNTER — HOSPITAL ENCOUNTER (EMERGENCY)
Age: 25
Discharge: HOME | End: 2025-03-12
Payer: COMMERCIAL

## 2025-03-12 ENCOUNTER — APPOINTMENT (OUTPATIENT)
Dept: CT IMAGING | Age: 25
End: 2025-03-12
Payer: COMMERCIAL

## 2025-03-12 VITALS
SYSTOLIC BLOOD PRESSURE: 139 MMHG | TEMPERATURE: 98.7 F | BODY MASS INDEX: 24.99 KG/M2 | OXYGEN SATURATION: 99 % | DIASTOLIC BLOOD PRESSURE: 87 MMHG | HEART RATE: 80 BPM | RESPIRATION RATE: 18 BRPM | WEIGHT: 150 LBS | HEIGHT: 65 IN

## 2025-03-12 DIAGNOSIS — R11.0 NAUSEA: ICD-10-CM

## 2025-03-12 DIAGNOSIS — R10.32 ABDOMINAL PAIN, LEFT LOWER QUADRANT: Primary | ICD-10-CM

## 2025-03-12 DIAGNOSIS — U07.1 COVID: ICD-10-CM

## 2025-03-12 LAB
ALBUMIN SERPL-MCNC: 4.6 G/DL (ref 3.4–5)
ALBUMIN/GLOB SERPL: 1.4 {RATIO} (ref 1.1–2.2)
ALP SERPL-CCNC: 47 U/L (ref 40–129)
ALT SERPL-CCNC: 21 U/L (ref 10–40)
ANION GAP SERPL CALCULATED.3IONS-SCNC: 11 MMOL/L (ref 3–16)
AST SERPL-CCNC: 27 U/L (ref 15–37)
BASOPHILS # BLD: 0 K/UL (ref 0–0.2)
BASOPHILS NFR BLD: 0.5 %
BILIRUB SERPL-MCNC: 0.5 MG/DL (ref 0–1)
BILIRUB UR QL STRIP.AUTO: NEGATIVE
BUN SERPL-MCNC: 13 MG/DL (ref 7–20)
CALCIUM SERPL-MCNC: 9.9 MG/DL (ref 8.3–10.6)
CHLORIDE SERPL-SCNC: 103 MMOL/L (ref 99–110)
CLARITY UR: CLEAR
CO2 SERPL-SCNC: 24 MMOL/L (ref 21–32)
COLOR UR: YELLOW
CREAT SERPL-MCNC: 0.8 MG/DL (ref 0.6–1.1)
DEPRECATED RDW RBC AUTO: 12.5 % (ref 12.4–15.4)
EOSINOPHIL # BLD: 0 K/UL (ref 0–0.6)
EOSINOPHIL NFR BLD: 0.5 %
FLUAV RNA RESP QL NAA+PROBE: NOT DETECTED
FLUBV RNA RESP QL NAA+PROBE: NOT DETECTED
GFR SERPLBLD CREATININE-BSD FMLA CKD-EPI: >90 ML/MIN/{1.73_M2}
GLUCOSE SERPL-MCNC: 88 MG/DL (ref 70–99)
GLUCOSE UR STRIP.AUTO-MCNC: NEGATIVE MG/DL
HCG SERPL QL: NEGATIVE
HCT VFR BLD AUTO: 39.4 % (ref 36–48)
HGB BLD-MCNC: 13.3 G/DL (ref 12–16)
HGB UR QL STRIP.AUTO: NEGATIVE
KETONES UR STRIP.AUTO-MCNC: 40 MG/DL
LEUKOCYTE ESTERASE UR QL STRIP.AUTO: NEGATIVE
LIPASE SERPL-CCNC: 17 U/L (ref 13–60)
LYMPHOCYTES # BLD: 1.8 K/UL (ref 1–5.1)
LYMPHOCYTES NFR BLD: 21.2 %
MCH RBC QN AUTO: 31.4 PG (ref 26–34)
MCHC RBC AUTO-ENTMCNC: 33.8 G/DL (ref 31–36)
MCV RBC AUTO: 92.8 FL (ref 80–100)
MONOCYTES # BLD: 0.5 K/UL (ref 0–1.3)
MONOCYTES NFR BLD: 6.3 %
NEUTROPHILS # BLD: 6.1 K/UL (ref 1.7–7.7)
NEUTROPHILS NFR BLD: 71.5 %
NITRITE UR QL STRIP.AUTO: NEGATIVE
PH UR STRIP.AUTO: 7 [PH] (ref 5–8)
PLATELET # BLD AUTO: 238 K/UL (ref 135–450)
PMV BLD AUTO: 9.2 FL (ref 5–10.5)
POTASSIUM SERPL-SCNC: 4.1 MMOL/L (ref 3.5–5.1)
PROT SERPL-MCNC: 7.8 G/DL (ref 6.4–8.2)
PROT UR STRIP.AUTO-MCNC: NEGATIVE MG/DL
RBC # BLD AUTO: 4.25 M/UL (ref 4–5.2)
SARS-COV-2 RNA RESP QL NAA+PROBE: DETECTED
SODIUM SERPL-SCNC: 138 MMOL/L (ref 136–145)
SP GR UR STRIP.AUTO: 1.02 (ref 1–1.03)
UA COMPLETE W REFLEX CULTURE PNL UR: ABNORMAL
UA DIPSTICK W REFLEX MICRO PNL UR: ABNORMAL
URN SPEC COLLECT METH UR: ABNORMAL
UROBILINOGEN UR STRIP-ACNC: 2 E.U./DL
WBC # BLD AUTO: 8.6 K/UL (ref 4–11)

## 2025-03-12 PROCEDURE — 84703 CHORIONIC GONADOTROPIN ASSAY: CPT

## 2025-03-12 PROCEDURE — 81003 URINALYSIS AUTO W/O SCOPE: CPT

## 2025-03-12 PROCEDURE — 96374 THER/PROPH/DIAG INJ IV PUSH: CPT

## 2025-03-12 PROCEDURE — 96375 TX/PRO/DX INJ NEW DRUG ADDON: CPT

## 2025-03-12 PROCEDURE — 87636 SARSCOV2 & INF A&B AMP PRB: CPT

## 2025-03-12 PROCEDURE — 83690 ASSAY OF LIPASE: CPT

## 2025-03-12 PROCEDURE — 80053 COMPREHEN METABOLIC PANEL: CPT

## 2025-03-12 PROCEDURE — 85025 COMPLETE CBC W/AUTO DIFF WBC: CPT

## 2025-03-12 PROCEDURE — 6360000002 HC RX W HCPCS

## 2025-03-12 PROCEDURE — 99284 EMERGENCY DEPT VISIT MOD MDM: CPT

## 2025-03-12 RX ORDER — ONDANSETRON 4 MG/1
4 TABLET, ORALLY DISINTEGRATING ORAL 3 TIMES DAILY PRN
Qty: 21 TABLET | Refills: 0 | Status: SHIPPED | OUTPATIENT
Start: 2025-03-12

## 2025-03-12 RX ORDER — ONDANSETRON 2 MG/ML
4 INJECTION INTRAMUSCULAR; INTRAVENOUS ONCE
Status: COMPLETED | OUTPATIENT
Start: 2025-03-12 | End: 2025-03-12

## 2025-03-12 RX ORDER — DICYCLOMINE HYDROCHLORIDE 10 MG/1
10 CAPSULE ORAL
Qty: 60 CAPSULE | Refills: 0 | Status: SHIPPED | OUTPATIENT
Start: 2025-03-12

## 2025-03-12 RX ORDER — KETOROLAC TROMETHAMINE 30 MG/ML
15 INJECTION, SOLUTION INTRAMUSCULAR; INTRAVENOUS ONCE
Status: COMPLETED | OUTPATIENT
Start: 2025-03-12 | End: 2025-03-12

## 2025-03-12 RX ORDER — IOPAMIDOL 755 MG/ML
75 INJECTION, SOLUTION INTRAVASCULAR
Status: DISCONTINUED | OUTPATIENT
Start: 2025-03-12 | End: 2025-03-12 | Stop reason: HOSPADM

## 2025-03-12 RX ORDER — KETOROLAC TROMETHAMINE 10 MG/1
10 TABLET, FILM COATED ORAL EVERY 6 HOURS PRN
Qty: 20 TABLET | Refills: 0 | Status: SHIPPED | OUTPATIENT
Start: 2025-03-12

## 2025-03-12 RX ADMIN — ONDANSETRON 4 MG: 2 INJECTION, SOLUTION INTRAMUSCULAR; INTRAVENOUS at 19:06

## 2025-03-12 RX ADMIN — KETOROLAC TROMETHAMINE 15 MG: 30 INJECTION, SOLUTION INTRAMUSCULAR at 19:05

## 2025-03-12 ASSESSMENT — PAIN SCALES - GENERAL: PAINLEVEL_OUTOF10: 9

## 2025-03-12 ASSESSMENT — PAIN DESCRIPTION - DESCRIPTORS: DESCRIPTORS: ACHING

## 2025-03-12 ASSESSMENT — PAIN - FUNCTIONAL ASSESSMENT: PAIN_FUNCTIONAL_ASSESSMENT: 0-10

## 2025-03-12 ASSESSMENT — PAIN DESCRIPTION - ORIENTATION: ORIENTATION: LEFT

## 2025-03-12 NOTE — ED PROVIDER NOTES
[facility] EMERGENCY DEPARTMENT  Emergency Department Encounter    Patient Name: Nell Bhandari  MRN: [id number]  YOB: 2000  Date of Evaluation: 3/12/2025  Provider: Anu Mak APRN - CNP  Note Started: 6:50 PM EDT 3/12/25    CHIEF COMPLAINT  Abdominal Pain (Left sided abd pain for several days. Was seen at urgent care and sent over. Is a week late on her period but had 4 negative pregnancy tests. Nausea. 9/10 pain)    SHARED SERVICE VISIT  DULCE. I have evaluated this patient.      HISTORY OF PRESENT ILLNESS  History From: Patient.    Limitations to history : None    Nell Bhandari is a 24 y.o. female with history of irregular menses who presents to the ED for evaluation of left-sided abdominal pain onset 4 days.  Patient states that she has been having constant left-sided abdominal pain that waxes and wanes in severity for the past 4 days.  Patient states that today it was exceptionally worse.  States that she has had some associated nausea however no vomiting.  States that she has increased urinary frequency however no dysuria or hematuria.  States that she is late on her menstrual period.  Last menstrual period February 4.  Patient states that she took a pregnancy test at home that was negative as well as a pregnancy test at urgent care that was negative.  Patient states that urgent care advised that she come here to the ED for further evaluation and management for concern for possible ovarian torsion or ectopic pregnancy.  Patient denies fever, chills, body aches, headache, cough, shortness of breath, chest pain, vomiting, changes in bowels, dysuria, neck pain, back pain, and lightheadedness.    No other complaints, modifying factors or associated symptoms.     Nursing notes reviewed were all reviewed and agreed with or any disagreements were addressed in the HPI.    PMH:  Past Medical History:   Diagnosis Date    Asthma     Bipolar depression (HCC)      Surgical